# Patient Record
Sex: FEMALE | Race: WHITE | Employment: OTHER | URBAN - METROPOLITAN AREA
[De-identification: names, ages, dates, MRNs, and addresses within clinical notes are randomized per-mention and may not be internally consistent; named-entity substitution may affect disease eponyms.]

---

## 2019-12-27 ENCOUNTER — HOSPITAL ENCOUNTER (EMERGENCY)
Facility: CLINIC | Age: 69
Discharge: HOME | End: 2019-12-27
Attending: EMERGENCY MEDICINE | Admitting: EMERGENCY MEDICINE
Payer: COMMERCIAL

## 2019-12-27 ENCOUNTER — APPOINTMENT (OUTPATIENT)
Dept: GENERAL RADIOLOGY | Facility: CLINIC | Age: 69
End: 2019-12-27
Attending: EMERGENCY MEDICINE
Payer: COMMERCIAL

## 2019-12-27 VITALS
OXYGEN SATURATION: 97 % | RESPIRATION RATE: 18 BRPM | DIASTOLIC BLOOD PRESSURE: 69 MMHG | SYSTOLIC BLOOD PRESSURE: 121 MMHG | TEMPERATURE: 97.9 F | WEIGHT: 181 LBS | HEART RATE: 88 BPM

## 2019-12-27 DIAGNOSIS — S80.12XA LEG HEMATOMA, LEFT, INITIAL ENCOUNTER: ICD-10-CM

## 2019-12-27 LAB
ANION GAP SERPL CALCULATED.3IONS-SCNC: 6 MMOL/L (ref 3–14)
BASOPHILS # BLD AUTO: 0 10E9/L (ref 0–0.2)
BASOPHILS NFR BLD AUTO: 0.4 %
BUN SERPL-MCNC: 20 MG/DL (ref 7–30)
CALCIUM SERPL-MCNC: 9 MG/DL (ref 8.5–10.1)
CHLORIDE SERPL-SCNC: 106 MMOL/L (ref 94–109)
CO2 SERPL-SCNC: 30 MMOL/L (ref 20–32)
CREAT SERPL-MCNC: 0.82 MG/DL (ref 0.52–1.04)
DIFFERENTIAL METHOD BLD: ABNORMAL
EOSINOPHIL # BLD AUTO: 0 10E9/L (ref 0–0.7)
EOSINOPHIL NFR BLD AUTO: 0.3 %
ERYTHROCYTE [DISTWIDTH] IN BLOOD BY AUTOMATED COUNT: 14.7 % (ref 10–15)
GFR SERPL CREATININE-BSD FRML MDRD: 72 ML/MIN/{1.73_M2}
GLUCOSE SERPL-MCNC: 176 MG/DL (ref 70–99)
HCT VFR BLD AUTO: 35.8 % (ref 35–47)
HGB BLD-MCNC: 10.5 G/DL (ref 11.7–15.7)
IMM GRANULOCYTES # BLD: 0 10E9/L (ref 0–0.4)
IMM GRANULOCYTES NFR BLD: 0.5 %
INR PPP: 1.02 (ref 0.86–1.14)
LYMPHOCYTES # BLD AUTO: 1.5 10E9/L (ref 0.8–5.3)
LYMPHOCYTES NFR BLD AUTO: 19.3 %
MCH RBC QN AUTO: 27.6 PG (ref 26.5–33)
MCHC RBC AUTO-ENTMCNC: 29.3 G/DL (ref 31.5–36.5)
MCV RBC AUTO: 94 FL (ref 78–100)
MONOCYTES # BLD AUTO: 0.3 10E9/L (ref 0–1.3)
MONOCYTES NFR BLD AUTO: 4 %
NEUTROPHILS # BLD AUTO: 5.8 10E9/L (ref 1.6–8.3)
NEUTROPHILS NFR BLD AUTO: 75.5 %
NRBC # BLD AUTO: 0 10*3/UL
NRBC BLD AUTO-RTO: 0 /100
PLATELET # BLD AUTO: 197 10E9/L (ref 150–450)
POTASSIUM SERPL-SCNC: 3.8 MMOL/L (ref 3.4–5.3)
RBC # BLD AUTO: 3.81 10E12/L (ref 3.8–5.2)
SODIUM SERPL-SCNC: 142 MMOL/L (ref 133–144)
WBC # BLD AUTO: 7.7 10E9/L (ref 4–11)

## 2019-12-27 PROCEDURE — 80048 BASIC METABOLIC PNL TOTAL CA: CPT | Performed by: EMERGENCY MEDICINE

## 2019-12-27 PROCEDURE — 85610 PROTHROMBIN TIME: CPT | Performed by: EMERGENCY MEDICINE

## 2019-12-27 PROCEDURE — 99285 EMERGENCY DEPT VISIT HI MDM: CPT | Mod: 25

## 2019-12-27 PROCEDURE — 85025 COMPLETE CBC W/AUTO DIFF WBC: CPT | Performed by: EMERGENCY MEDICINE

## 2019-12-27 PROCEDURE — 96374 THER/PROPH/DIAG INJ IV PUSH: CPT

## 2019-12-27 PROCEDURE — 96376 TX/PRO/DX INJ SAME DRUG ADON: CPT

## 2019-12-27 PROCEDURE — 25000128 H RX IP 250 OP 636: Performed by: EMERGENCY MEDICINE

## 2019-12-27 PROCEDURE — 73590 X-RAY EXAM OF LOWER LEG: CPT | Mod: LT

## 2019-12-27 RX ORDER — OXYCODONE HYDROCHLORIDE 5 MG/1
5 TABLET ORAL EVERY 6 HOURS PRN
Qty: 10 TABLET | Refills: 0 | Status: SHIPPED | OUTPATIENT
Start: 2019-12-27

## 2019-12-27 RX ORDER — HYDROMORPHONE HYDROCHLORIDE 1 MG/ML
0.2 INJECTION, SOLUTION INTRAMUSCULAR; INTRAVENOUS; SUBCUTANEOUS
Status: DISCONTINUED | OUTPATIENT
Start: 2019-12-27 | End: 2019-12-27 | Stop reason: HOSPADM

## 2019-12-27 RX ADMIN — HYDROMORPHONE HYDROCHLORIDE 0.2 MG: 1 INJECTION, SOLUTION INTRAMUSCULAR; INTRAVENOUS; SUBCUTANEOUS at 14:50

## 2019-12-27 RX ADMIN — HYDROMORPHONE HYDROCHLORIDE 0.2 MG: 1 INJECTION, SOLUTION INTRAMUSCULAR; INTRAVENOUS; SUBCUTANEOUS at 14:08

## 2019-12-27 ASSESSMENT — ENCOUNTER SYMPTOMS: COLOR CHANGE: 1

## 2019-12-27 NOTE — ED AVS SNAPSHOT
[facility] Emergency Department  [address]  [facility] [address]  Phone:  [phone]  Fax:  [phone]                                    Gwen Holter   MRN: [id number]    Department:  [facility] Emergency Department   Date of Visit:  12/27/2019           After Visit Summary Signature Page    I have received my discharge instructions, and my questions have been answered. I have discussed any challenges I see with this plan with the nurse or doctor.    ..........................................................................................................................................  Patient/Patient Representative Signature      ..........................................................................................................................................  Patient Representative Print Name and Relationship to Patient    ..................................................               ................................................  Date                                   Time    ..........................................................................................................................................  Reviewed by Signature/Title    ...................................................              ..............................................  Date                                               Time          22EPIC Rev 08/18

## 2019-12-27 NOTE — ED PROVIDER NOTES
History     Chief Complaint:  Left leg hematoma      HPI   Gwen Holter is a 69 year old female anticoagulated on Plavix and baby Aspirin with a history of diabetes type 2, hypertension, hyperlipidemia, hypertrophic cardiomyopathy, diastolic heart failure, CAD, chronic pain syndrome, and peripheral vascular disease who presents to the emergency department with her son and daughter for evaluation of leg pain and hematoma. The patient's son reports that earlier today he was helping the patient up the stairs when her legs gave out and she hit her left leg on the stairs, resulting in severe pain with a large, raised, black and blue hematoma to her lower leg. EMS was then called as she wasn't able to walk. Per the daughter, she was given Dilaudid by EMS. She has never had a hematoma this large before, but she did fall on her face about three weeks ago and sustained significant bruising at that time as well. She spent some time in rehab and currently resides in an assisted living facility in [facility].     Allergies:  Morphine   Amlodipine  Chlorambucil derivatives   Hydrocodone  Azathioprine  Atorvastatin  Lisinopril  Methotrexate  Penicillins     Medications:    Colace  Aspirin 81 mg   Nitrostat  Lioresal  Zyloprim  Pravachol  Plavix  Lopressor  Zoloft  Ativan  Seroquel  Metformin  Kenalog   Demadex    Past Medical History:    Diabetes type 2  Anxiety  Chronic diastolic heart failure   Gout   Stroke  Obesity  Hyperkalemia/natremia  Toxic encephalopathy  Dysphagia  CAD  Hypertrophic cardiomyopathy   Polymyositis   Vitamin D deficiency   CKD  TIA  Endometrial cancer  Hyperlipidemia  Hypertension   NSTEMI  Sepsis  Chronic pain syndrome   Pyelonephritis   Depression  Peripheral vascular disease    Past Surgical History:    Tubal ligation  Laminectomy  WHITNEY  Carpal tunnel revision  Intraocular lens prosthesis     Family History:    Alcoholism  Thyroid disease  Pancreatic cancer  Ophthalmic disease  Breast cancer  Diabetes  type 2  Lung cancer  Arthritis  Polymyositis   Cardiovascular disease: mother    Social History:  Tobacco Use: Never  Alcohol Use: No  Lives in assisted living  PCP: Provider Not In System     Review of Systems   Musculoskeletal:        Positive for left leg pain.   Skin: Positive for color change (left leg).   All other systems reviewed and are negative.      Physical Exam     Patient Vitals for the past 24 hrs:   BP Temp Temp src Pulse Heart Rate Resp SpO2 Weight   12/27/19 1500 109/78 -- -- 86 -- -- 98 % --   12/27/19 1430 (!) 143/58 -- -- 95 -- 18 97 % --   12/27/19 1300 (!) 124/93 -- -- 92 -- 22 93 % --   12/27/19 1252 (!) 141/95 97.9  F (36.6  C) Oral 93 93 24 93 % 82.1 kg (181 lb)       Physical Exam    Nursing note and vitals reviewed.  Constitutional: Cooperative.   HENT:   Mouth/Throat: Moist mucous membranes.   Eyes: EOMI, nonicteric sclera  Cardiovascular: Normal rate, regular rhythm Normal DP pulses bilaterally.   Pulmonary/Chest: No distress.   Musculoskeletal: Normal range of motion. Large left leg hematoma as below.  Normal range of motion at knee and ankle.  Patient able to wiggle all toes.  No pain with PROM.   Neurological: Alert. Moves all extremities spontaneously.   Skin: Skin is warm and dry. No rash noted.   Psychiatric: Normal mood and affect.               Emergency Department Course   Imaging:  Tib/Fib XR, Left, 2 Views:  IMPRESSION: Lateral mid leg soft tissue swelling. No apparent fracture  or osseous destruction. Medium and small vessel atherosclerotic  calcification is noted within the leg. Nonspecific calcification is  present anterior to the distal tibia at the tibiotalar joint.  Calcaneal spurs are incidentally noted.  Reading per radiology.      Imaging independently reviewed and agree with radiologist interpretation.   Radiographic findings were communicated with the patient who voiced understanding of the findings.    Laboratory:  CBC: WBC: 7.7, HGB: 10.5 (L), PLT: 197  BMP:  Glucose 176 (H), o/w WNL (Creatinine: 0.82)    INR: 1.02    Interventions:  1408 Dilaudid 0.2 mg IV    Emergency Department Course:  1309 Nursing notes and vitals reviewed. I performed an exam of the patient as documented above.     IV inserted. Medicine administered as documented above. Blood drawn. This was sent to the lab for further testing, results above.    The patient was sent for a tib/fib XR while in the emergency department, findings above.     1449 I consulted with Stacie PATTON, orthopedics, regarding the patient's history and presentation here in the emergency department.    1520 I rechecked the patient and discussed the results of her workup thus far.     Findings and plan explained to the Patient. Patient discharged home with instructions regarding supportive care, medications, and reasons to return. The importance of close follow-up was reviewed. The patient was prescribed oxycodone for pain relief.     I personally reviewed the laboratory results with the Patient and answered all related questions prior to discharge.     Impression & Plan    Medical Decision Making:  Patient presents with large hematoma to her left leg.  No fracture noted on imaging.  Discussed with orthopedic surgery who reviewed my images.  Despite the size of the hematoma, they did not recommend any draining or intervention at this time.  Instead, Ace wrap, elevation, ice was recommended.  Initially, patient was going to be admitted for pain control and monitoring of her leg, however she then changed her mind and wished to be discharged home.  She resides in an assisted living facility in [facility], and per her family, was not supposed to be walking with a walker anyway, and usually only uses a wheelchair.  She reports that she has enough help at her facility and she believes that all of her ADLs will be able to be carried out.  I did prescribe oxycodone for pain relief for the coming days.  We did also apply an Ace wrap to  help with compression.  We discussed reasons to return to her closest emergency department including signs of infection, worsening pain suggestive of compartment syndrome, numbness/tingling, or for any other concerns.  She is discharged in stable condition.  All of her and her family's questions were answered and they are in agreement with the plan.    Diagnosis:    ICD-10-CM    1. Leg hematoma, left, initial encounter S80.12XA        Disposition:    Discharged to home     Discharge Medications:  Discharge Medication List as of 12/27/2019  3:45 PM      START taking these medications    Details   oxyCODONE (ROXICODONE) 5 MG tablet Take 1 tablet (5 mg) by mouth every 6 hours as needed for pain, Disp-10 tablet, R-0, Local Print           Scribe Disclosure:  I, Tra Lyon, am serving as a scribe on 12/27/2019 at 1:09 PM to personally document services performed by Marcelino Campbell MD based on my observations and the provider's statements to me.     Tra Lyon  12/27/2019   [facility] EMERGENCY DEPARTMENT       Marcelino Escalona MD  12/27/19 2019

## 2019-12-27 NOTE — ED NOTES
Bed: ED12  Expected date: 12/27/19  Expected time: 12:33 PM  Means of arrival: Ambulance  Comments:

## 2019-12-27 NOTE — ED TRIAGE NOTES
Brought in by EMS. Pt has a large left lateral lower leg hematoma from leaning into a step. Pt is on blood thinners. Denies any falls.

## 2019-12-27 NOTE — ED NOTES
[facility]  ED Nurse Handoff Report    Gwen Holter is a 69 year old female   ED Chief complaint: Left leg hematoma  . ED Diagnosis:   Final diagnoses:   Leg hematoma, left, initial encounter     Allergies:   Allergies   Allergen Reactions     Morphine      Psychotic       Code Status: Full Code  Activity level - Baseline/Home:  Assist X 2. Activity Level - Current:   Assist X 2. Lift room needed: No. Bariatric: No   Needed: No   Isolation: No. Infection: Not Applicable.     Vital Signs:   Vitals:    12/27/19 1300 12/27/19 1430 12/27/19 1500 12/27/19 1515   BP: (!) 124/93 (!) 143/58 109/78 121/69   Pulse: 92 95 86 88   Resp: 22 18     Temp:       TempSrc:       SpO2: 93% 97% 98% 97%   Weight:           Cardiac Rhythm:  ,      Pain level: 0-10 Pain Scale: 9  Patient confused: No. Patient Falls Risk: Yes.   Elimination Status: Has voided, Pure wick in place  Patient Report - Initial Complaint: Leg pain. Focused Assessment: Musculoskeletal-  Large left leg hematoma sustained from leaning in to a step while trying to walk up the stairs. Pt reside in an AL facility and is typically wheelchair bound. Was using a walker with family up the steps. No fall, but lean caused large hematoma and pain.   Tests Performed: Labs, Xray. Abnormal Results:   Labs Ordered and Resulted from Time of ED Arrival Up to the Time of Departure from the ED   CBC WITH PLATELETS DIFFERENTIAL - Abnormal; Notable for the following components:       Result Value    Hemoglobin 10.5 (*)     MCHC 29.3 (*)     All other components within normal limits   BASIC METABOLIC PANEL - Abnormal; Notable for the following components:    Glucose 176 (*)     All other components within normal limits   INR     XR Tibia & Fibula Left 2 Views   Final Result   IMPRESSION: Lateral mid leg soft tissue swelling. No apparent fracture   or osseous destruction. Medium and small vessel atherosclerotic   calcification is noted within the leg. Nonspecific  calcification is   present anterior to the distal tibia at the tibiotalar joint.   Calcaneal spurs are incidentally noted.      SONU MOREJON MD        .   Treatments provided: 0.2 mg dilaudid x 2  Family Comments: Family at bedside  OBS brochure/video discussed/provided to patient:  N/A  ED Medications:   Medications   HYDROmorphone (PF) (DILAUDID) injection 0.2 mg (0.2 mg Intravenous Given 12/27/19 1450)     Drips infusing:  No  For the majority of the shift, the patient's behavior Green. Interventions performed were N/A.     Severe Sepsis OR Septic Shock Diagnosis Present: No      ED Nurse Name/Phone Number: Corrina Blue RN,   3:30 PM

## 2019-12-27 NOTE — ED NOTES
Education provided to pt about fall risks when fall risk band applied.  To avoid injury, pt encouraged to use call light to alert staff if they have needs.  Examples of safety concerns provided.   Pt verbalizes understanding of fall risk and safety concerns.

## 2019-12-27 NOTE — DISCHARGE INSTRUCTIONS
Diagnosis: Left leg hematoma  Plan: Return back to assisted living. Schedule follow-up with your doctor early next week. Keep leg elevated, and apply an ace wrap when pain is improved enough to do so. May also consider ice packs.   Return Precautions:   Return to closest emergency department if fever > 100.4, if the hematoma breaks open and starts draining, if pain increases, surrounding redness/swelling, or for any other concerns.     Please read the remainder of your discharge instructions for more information.

## 2025-05-24 NOTE — PROGRESS NOTES
Discussed patient with Dr. Escalona     Large left LE lateral hematoma  On Plavix and ASA    Recs: Ace bandage from base of toes to over knee  Elevate as much as possible  Ice   Non-operative management at this time  Anticoag per hospitalist  If skin necrosis occurs would consult plastics for eventual need of skin grafting    Will recheck in AM    Stacie CUEVAS   (1) Other Diagnosis